# Patient Record
Sex: MALE | Race: WHITE | Employment: UNEMPLOYED | URBAN - NONMETROPOLITAN AREA
[De-identification: names, ages, dates, MRNs, and addresses within clinical notes are randomized per-mention and may not be internally consistent; named-entity substitution may affect disease eponyms.]

---

## 2019-09-12 ENCOUNTER — HOSPITAL ENCOUNTER (EMERGENCY)
Age: 39
Discharge: HOME | End: 2019-09-12
Attending: FAMILY MEDICINE

## 2019-09-12 VITALS
RESPIRATION RATE: 16 BRPM | HEIGHT: 74 IN | DIASTOLIC BLOOD PRESSURE: 98 MMHG | SYSTOLIC BLOOD PRESSURE: 133 MMHG | BODY MASS INDEX: 24.38 KG/M2 | OXYGEN SATURATION: 99 % | HEART RATE: 98 BPM | TEMPERATURE: 98.6 F | WEIGHT: 190 LBS
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
## 2019-09-12 PROCEDURE — 99281 EMR DPT VST MAYX REQ PHY/QHP: CPT

## 2019-09-12 RX ORDER — DIAPER,BRIEF,INFANT-TODD,DISP
EACH MISCELLANEOUS ONCE
Status: DISCONTINUED | OUTPATIENT
Start: 2019-09-12 | End: 2019-09-12 | Stop reason: HOSPADM

## 2019-09-12 ASSESSMENT — ENCOUNTER SYMPTOMS
DIARRHEA: 0
COUGH: 0
ABDOMINAL PAIN: 0
VOMITING: 0
PHOTOPHOBIA: 0
NAUSEA: 0
SHORTNESS OF BREATH: 0
WHEEZING: 0
RHINORRHEA: 0
BACK PAIN: 0

## 2019-09-12 NOTE — ED NOTES
Pt reported that the system in the pharmacy was currently being worked on and would take some more time before receiving medications ordered. Pt requested discharge stating that he would pick the medication up himself.      Michael Hightower RN  09/12/19 [id number]

## 2019-09-12 NOTE — ED PROVIDER NOTES
[address] COMPLAINT   Chief Complaint   Patient presents with    Rash        HPI   Ashly Brody is a 46 y.o. male who presents with rash on his arms and legs, onset was just prior to arrival. The context is that the patient denies any new soaps or detergent use, denies seeing any bug bites but have these welt like rash on his arms and legs. He denies tongue, lip or throat swelling. There are no alleviating factors. The duration has been constant since the onset     REVIEW OF SYSTEMS   Cardiac: No Chest Pain, No syncope  Respiratory: denies sob  ENT: No tongue or throat swelling  GI: Denies Vomiting or abdominal pain  Skin: +rash on arms and legs. General: No Fever   Review of systems otherwise negative. PAST MEDICAL & SURGICAL HISTORY   History reviewed. No pertinent past medical history. History reviewed. No pertinent surgical history.      CURRENT MEDICATIONS      ALLERGIES   No Known Allergies     SOCIAL & FAMILY HISTORY   Social History     Socioeconomic History    Marital status: Single     Spouse name: None    Number of children: None    Years of education: None    Highest education level: None   Occupational History    None   Social Needs    Financial resource strain: None    Food insecurity:     Worry: None     Inability: None    Transportation needs:     Medical: None     Non-medical: None   Tobacco Use    Smoking status: Current Every Day Smoker     Packs/day: 0.50     Types: Cigarettes    Smokeless tobacco: Never Used   Substance and Sexual Activity    Alcohol use: Yes     Comment: rare    Drug use: Yes     Types: IV, Cocaine    Sexual activity: None   Lifestyle    Physical activity:     Days per week: None     Minutes per session: None    Stress: None   Relationships    Social connections:     Talks on phone: None     Gets together: None     Attends Rastafarian service: None     Active member of club or organization: None     Attends meetings of clubs or organizations: None     Relationship status: None    Intimate partner violence:     Fear of current or ex partner: None     Emotionally abused: None     Physically abused: None     Forced sexual activity: None   Other Topics Concern    None   Social History Narrative    None     History reviewed. No pertinent family history. PHYSICAL EXAM   VITAL SIGNS: BP (!) 133/98   Pulse 98   Temp 98.6 °F (37 °C) (Oral)   Resp 16   Ht 6' 2\" (1.88 m)   Wt 190 lb (86.2 kg)   SpO2 99%   BMI 24.39 kg/m²    Constitutional: Well developed, well nourished, no acute distress   HENT: Atraumatic, moist mucus membranes  Neck: supple, no JVD   Respiratory: Lungs Clear, no retractions   Cardiovascular: normal rate, no murmurs  Vascular: Radial pulses 2+ equal bilaterally  GI: Soft, nontender, normal bowel sounds  Musculoskeletal: No edema, no deformities  Integument: Skin warm and dry, multiple discrete raised papular rash with scabs overlying both forearms and legs. No petechiae or vesicles were noted   Neurologic: Alert & oriented, normal speech  Psych: Pleasant affect, no hallucinations     RADIOLOGY/PROCEDURES   none    ED COURSE & MEDICAL DECISION MAKING   Pertinent Labs & Imaging studies reviewed and interpreted. (See chart for details)  See chart for details of medications given during the ED stay. Vitals:    09/12/19 1124   BP: (!) 133/98   Pulse: 98   Resp: 16   Temp: 98.6 °F (37 °C)   SpO2: 99%       Differential Diagnosis: bug bite, contact dermatitis    Final IMPRESSION   1. Bug bite, initial encounter        I did not see any concerning signs for pt's rash such as mucosal or ophthalmic involvement. The rash is raised papular as opposed to vesicular or petechial. We will give pt a dose of topical hydrocortisone cream for relief.     Plan: Discharge with medications and outpatient followup (see EMR)       Ramses Krishnan MD  09/12/19 1144

## 2019-09-12 NOTE — ED PROVIDER NOTES
**This is a Medical Student Note and is charted for educational purposes. The non-physician staff attested note is not to be used for billing purposes, chart documentation or to guide patient care. Please see the supervising physician modifications/attestation for treatment plan/chart documentation/suggestions. This note has been reviewed and feedback has been provided to the student. **    [facility] EMERGENCY DEPT  EMERGENCY DEPARTMENT     Pt Name: Monalisa Briggs  MRN: [id number]  [place] 1980  Date of evaluation: 9/12/2019  Provider: Buddy Suarez COMPLAINT       Chief Complaint   Patient presents with    Rash       HISTORY OF PRESENT ILLNESS    Monalisa Briggs is a 46 y.o. male who presents to the emergency department for rash. The patient states that one week ago, he developed a rash diffusely on the body. He describes the rash a constant itchiness which is moderate in severity, and worsens at night. He has mild pain with the rash. He denies any fever, chills, fatigue, chest pain, or SOB. Patient does admit to IV drug use. Triage notes and Nursing notes were reviewed by myself. Any discrepancies are addressed above. PAST MEDICAL HISTORY   History reviewed. No pertinent past medical history. SURGICAL HISTORY     History reviewed. No pertinent surgical history. CURRENT MEDICATIONS       Previous Medications    No medications on file       ALLERGIES     Patient has no known allergies. FAMILY HISTORY     History reviewed. No pertinent family history.      SOCIAL HISTORY       Social History     Socioeconomic History    Marital status: Single     Spouse name: None    Number of children: None    Years of education: None    Highest education level: None   Occupational History    None   Social Needs    Financial resource strain: None    Food insecurity:     Worry: None     Inability: None    Transportation needs:     Medical: None     Non-medical: None   Tobacco